# Patient Record
Sex: FEMALE
[De-identification: names, ages, dates, MRNs, and addresses within clinical notes are randomized per-mention and may not be internally consistent; named-entity substitution may affect disease eponyms.]

---

## 2024-01-18 PROBLEM — Z00.00 ENCOUNTER FOR PREVENTIVE HEALTH EXAMINATION: Status: ACTIVE | Noted: 2024-01-18

## 2024-01-18 PROBLEM — F64.9 GENDER DYSPHORIA: Status: ACTIVE | Noted: 2024-01-18

## 2024-01-19 ENCOUNTER — APPOINTMENT (OUTPATIENT)
Dept: PLASTIC SURGERY | Facility: CLINIC | Age: 29
End: 2024-01-19
Payer: COMMERCIAL

## 2024-01-19 VITALS — WEIGHT: 145 LBS | HEIGHT: 70 IN | BODY MASS INDEX: 20.76 KG/M2

## 2024-01-19 DIAGNOSIS — F64.9 GENDER IDENTITY DISORDER, UNSPECIFIED: ICD-10-CM

## 2024-01-19 PROCEDURE — 99204 OFFICE O/P NEW MOD 45 MIN: CPT

## 2024-01-19 RX ORDER — ESTRADIOL 10 UG/1
TABLET, FILM COATED VAGINAL
Refills: 0 | Status: ACTIVE | COMMUNITY

## 2024-01-19 RX ORDER — EMTRICITABINE AND TENOFOVIR DISOPROXIL FUMARATE 167; 250 MG/1; MG/1
TABLET, FILM COATED ORAL
Refills: 0 | Status: ACTIVE | COMMUNITY

## 2024-01-19 RX ORDER — PROGESTERONE 200 MG/1
CAPSULE ORAL
Refills: 0 | Status: ACTIVE | COMMUNITY

## 2024-01-19 NOTE — HISTORY OF PRESENT ILLNESS
[FreeTextEntry1] :  DEYVI MARTIN is a 28 year old male to female  transgender patient with a history of gender dysphoria. She seeks consultation for breast augmentation surgery. She reports that she has been socially transitioning for about 3 years now. She has been on feminizing hormones for approximately 2 years.  She denies past medical history. She denies significant mental health history. She is currently in transgender care at [facility]. She states she is still an A cup and she is hoping to be approximately a C. She denies any residual breast pain. She denies any lumps, bumps, or other recent changes in her breasts. She has not had any breast imaging. She denies any family history of breast cancer or blood clots. She denies cigarette use, drinks some alcohol, and occasionally smokes marijuana. She expresses understanding that she will need to abstain from smoking for 6 weeks before and 6 weeks after surgery.  Patient denies history of previous gender affirming surgeries and gender affirming procedures such as Botox, silicone, fillers, liposuction and fat grafting. Patient denies personal and family medical history of clots, strokes, bleeding disorders and anesthesia problems. She denies history of dense or cystic breast tissue. No family history of breast cancer. She reports that the male appearance of her chest exacerbate her gender dysphoria and cause misgendering.

## 2024-01-19 NOTE — ASSESSMENT
[FreeTextEntry1] : Pt was seen and examined together by KRISTEN Atkinson and Dr. Michael Alexandra. Assessment and plan formulated and discussed at time of visit.

## 2024-07-11 ENCOUNTER — APPOINTMENT (OUTPATIENT)
Dept: PLASTIC SURGERY | Facility: AMBULATORY SURGERY CENTER | Age: 29
End: 2024-07-11